# Patient Record
Sex: FEMALE | Race: WHITE | NOT HISPANIC OR LATINO | Employment: UNEMPLOYED | URBAN - METROPOLITAN AREA
[De-identification: names, ages, dates, MRNs, and addresses within clinical notes are randomized per-mention and may not be internally consistent; named-entity substitution may affect disease eponyms.]

---

## 2019-10-31 ENCOUNTER — OFFICE VISIT (OUTPATIENT)
Dept: ORTHOPEDIC SURGERY | Facility: CLINIC | Age: 74
End: 2019-10-31

## 2019-10-31 VITALS — HEIGHT: 67 IN | HEART RATE: 80 BPM | OXYGEN SATURATION: 98 % | BODY MASS INDEX: 29.24 KG/M2 | WEIGHT: 186.29 LBS

## 2019-10-31 DIAGNOSIS — M17.12 PRIMARY OSTEOARTHRITIS OF LEFT KNEE: Primary | ICD-10-CM

## 2019-10-31 DIAGNOSIS — M25.562 ACUTE PAIN OF LEFT KNEE: ICD-10-CM

## 2019-10-31 PROCEDURE — 99204 OFFICE O/P NEW MOD 45 MIN: CPT | Performed by: ORTHOPAEDIC SURGERY

## 2019-10-31 RX ORDER — SIMVASTATIN 40 MG
TABLET ORAL
COMMUNITY
Start: 2019-08-19

## 2019-10-31 RX ORDER — VENLAFAXINE 25 MG/1
25 TABLET ORAL 2 TIMES DAILY
COMMUNITY

## 2019-10-31 RX ORDER — FLUOXETINE 10 MG/1
10 CAPSULE ORAL DAILY
COMMUNITY

## 2019-10-31 RX ORDER — MELOXICAM 15 MG/1
TABLET ORAL
Qty: 60 TABLET | Refills: 0 | Status: SHIPPED | OUTPATIENT
Start: 2019-10-31 | End: 2019-11-26 | Stop reason: SDUPTHER

## 2019-10-31 RX ORDER — LEVOTHYROXINE SODIUM 88 UG/1
TABLET ORAL
COMMUNITY
Start: 2019-08-17

## 2019-10-31 NOTE — PROGRESS NOTES
Orthopaedic Clinic Note: Knee New Patient    Chief Complaint   Patient presents with   • Left Knee - Pain        HPI    Vikki Sandoval is a 74 y.o. female who presents with left knee pain for 2 week(s). Onset has been atraumatic and gradual nature.  She has known left knee arthritis and has been treated in the past with cortisone injections.  She recently moved here from [place].  She underwent a cortisone injection in September but during the move developed increasing pain and swelling as well as a buckling episode of the knee.  She is complaining of swelling, popping, dull aching and throbbing pain in the knee that is worse with walking and standing.  Sitting and resting eases her pain.  She is taking occasional Advil with only slight improvement.  She is ambulating with assistance of a cane.  She is here to discuss treatment options for ongoing knee pain which is causing limitations in daily activities.    Past Medical History:   Diagnosis Date   • Anxiety    • Diabetes (CMS/HCC)    • Hyperlipidemia    • Hypothyroid       Past Surgical History:   Procedure Laterality Date   • FOOT SURGERY Left     achilles, bone spur   • FOOT SURGERY Right     bone spur   • HYSTERECTOMY     • SHOULDER SURGERY Left     total replacement   • SHOULDER SURGERY Right     RCR      Family History   Problem Relation Age of Onset   • Cancer Mother      Social History     Socioeconomic History   • Marital status:      Spouse name: Not on file   • Number of children: Not on file   • Years of education: Not on file   • Highest education level: Not on file   Tobacco Use   • Smoking status: Former Smoker   • Smokeless tobacco: Never Used   Substance and Sexual Activity   • Alcohol use: Yes     Frequency: Never     Comment: occ   • Drug use: No   • Sexual activity: Defer      Current Outpatient Medications on File Prior to Visit   Medication Sig Dispense Refill   • FLUoxetine (PROzac) 10 MG capsule Take 10 mg by mouth Daily.     •  "levothyroxine (SYNTHROID, LEVOTHROID) 88 MCG tablet      • metFORMIN (GLUCOPHAGE) 1000 MG tablet      • simvastatin (ZOCOR) 40 MG tablet      • venlafaxine (EFFEXOR) 25 MG tablet Take 25 mg by mouth 2 (Two) Times a Day.       No current facility-administered medications on file prior to visit.       No Known Allergies     Review of Systems   Constitutional: Negative.    HENT: Negative.    Eyes: Negative.    Respiratory: Negative.    Cardiovascular: Negative.    Gastrointestinal: Negative.    Endocrine: Negative.    Genitourinary: Negative.    Musculoskeletal: Positive for arthralgias.   Skin: Negative.    Allergic/Immunologic: Negative.    Neurological: Negative.    Hematological: Negative.    Psychiatric/Behavioral: Negative.         The patient's Review of Systems was personally reviewed and confirmed as accurate.    The following portions of the patient's history were reviewed and updated as appropriate: allergies, current medications, past family history, past medical history, past social history, past surgical history and problem list.    Physical Exam  Pulse 80, height 170.2 cm (67\"), weight 84.5 kg (186 lb 4.6 oz), SpO2 98 %.    Body mass index is 29.18 kg/m².    GENERAL APPEARANCE: awake, alert & oriented x 3, in no acute distress and well developed, well nourished  PSYCH: normal affect  LUNGS:  breathing nonlabored  EYES: sclera anicteric  CARDIOVASCULAR: palpable dorsalis pedis, palpable posterior tibial bilaterally. Capillary refill less than 2 seconds  EXTREMITIES: no clubbing, cyanosis  GAIT:  Antalgic            Right Lower Extremity Exam:   ----------  Hip Exam  ----------  FLEXION CONTRACTURE: None  FLEXION: 110 degrees  INTERNAL ROTATION: 20 degrees at 90 degrees of flexion   EXTERNAL ROTATION: 40 degrees at 90 degrees of flexion    PAIN WITH HIP MOTION: no  ----------  Knee Exam  ----------  ALIGNMENT: neutral, no varus or valgus deformity     RANGE OF MOTION:  Normal (0-120 degrees) with no " extensor lag or flexion contracture  LIGAMENTOUS STABILITY:   stable to varus and valgus stress at 0 and 30 degrees without any evidence of laxity     STRENGTH:  5/5 knee flexion, extension. 5/5 ankle dorsiflexion and plantarflexion.     PAIN WITH PALPATION: denies tenderness to palpation about the knee, denies medial or lateral joint line pain  KNEE EFFUSION: no  PAIN WITH KNEE ROM: no  PATELLAR CREPITUS: yes, asymptomatic  SPECIAL EXAM FINDINGS:  Negative patellar compression    REFLEXES:  PATELLAR 2+/4  ACHILLES 2+/4    CLONUS: negative  STRAIGHT LEG TEST:   negative    SENSATION TO LIGHT TOUCH:  DEEP PERONEAL/SUPERFICIAL PERONEAL/SURAL/SAPHENOUS/TIBIAL:   intact    EDEMA:  no  ERYTHEMA:  no  WOUNDS/INCISIONS: no overlying skin problems.    Left Lower Extremity Exam:   ----------  Hip Exam  ----------  FLEXION CONTRACTURE: None  FLEXION: 110 degrees  INTERNAL ROTATION: 20 degrees at 90 degrees of flexion   EXTERNAL ROTATION: 40 degrees at 90 degrees of flexion    PAIN WITH HIP MOTION: no  ----------  Knee Exam  ----------  ALIGNMENT: moderate varus, correctible to neutral    RANGE OF MOTION:  Decreased (3 - 115 degrees) with no extensor lag  LIGAMENTOUS STABILITY:   stable to varus and valgus stress at terminal extension and 30 degrees; slight retensioning of the MCL is appreciated with valgus stress at 30 degrees consistent with medial compartment degeneration     STRENGTH:  5/5 knee flexion, extension. 5/5 ankle dorsiflexion and plantarflexion.     PAIN WITH PALPATION: global  KNEE EFFUSION: yes, moderate effusion  PAIN WITH KNEE ROM: yes, global  PATELLAR CREPITUS: yes, asymptomatic  SPECIAL EXAM FINDINGS:  none    REFLEXES:  PATELLAR 2+/4  ACHILLES 2+/4    CLONUS: no  STRAIGHT LEG TEST:   negative    SENSATION TO LIGHT TOUCH:  DEEP PERONEAL/SUPERFICIAL PERONEAL/SURAL/SAPHENOUS/TIBIAL:   intact    EDEMA:  no  ERYTHEMA:  no  WOUNDS/INCISIONS:   no      ______________________________________________________________________  ______________________________________________________________________    RADIOGRAPHIC FINDINGS:   Indication: Left knee pain    Comparison: No prior xrays are available for comparison    Left knee(s) 4 views: moderate to severe varus osteoarthritis with significant medial compartment joint space narrowing and periarticular osteophytes, Moderate patellofemoral osteoarthritis      Assessment/Plan:   Diagnosis Plan   1. Primary osteoarthritis of left knee  meloxicam (MOBIC) 15 MG tablet   2. Acute pain of left knee  XR Knee 4+ View Left     Patient has left knee arthritis.  I discussed treatment options.  It is too early to proceed with repeat cortisone injection and aspiration of the knee today.  I will prescribe her oral anti-inflammatory.  She will follow-up in 6 weeks.    Joel Gaona MD  10/31/19  10:39 AM

## 2019-11-26 DIAGNOSIS — M17.12 PRIMARY OSTEOARTHRITIS OF LEFT KNEE: ICD-10-CM

## 2019-11-26 RX ORDER — MELOXICAM 15 MG/1
TABLET ORAL
Qty: 60 TABLET | Refills: 0 | Status: SHIPPED | OUTPATIENT
Start: 2019-11-26 | End: 2019-12-12 | Stop reason: SDUPTHER

## 2019-12-12 ENCOUNTER — OFFICE VISIT (OUTPATIENT)
Dept: ORTHOPEDIC SURGERY | Facility: CLINIC | Age: 74
End: 2019-12-12

## 2019-12-12 VITALS — BODY MASS INDEX: 30.42 KG/M2 | OXYGEN SATURATION: 96 % | WEIGHT: 193.8 LBS | HEART RATE: 88 BPM | HEIGHT: 67 IN

## 2019-12-12 DIAGNOSIS — M17.12 PRIMARY OSTEOARTHRITIS OF LEFT KNEE: Primary | ICD-10-CM

## 2019-12-12 PROCEDURE — 99212 OFFICE O/P EST SF 10 MIN: CPT | Performed by: ORTHOPAEDIC SURGERY

## 2019-12-12 RX ORDER — MELOXICAM 15 MG/1
TABLET ORAL
Qty: 60 TABLET | Refills: 0 | Status: SHIPPED | OUTPATIENT
Start: 2019-12-12

## 2023-04-17 NOTE — PROGRESS NOTES
Orthopaedic Clinic Note: Knee Established Patient    Chief Complaint   Patient presents with   • Follow-up     6 week follow up; Primary osteoarthritis of left knee        HPI    It has been 6  week(s) since Ms. Sandoval's last visit. She returns to clinic today for follow-up left knee osteoarthritis.  At her last visit, she was prescribed an oral anti-inflammatory.  The anti-inflammatory has done well and her pain has significantly improved.  She rates her pain a 2/10 on the pain scale.  She only has occasional discomfort with walking and rising from a seated position.  Otherwise she is able to do all daily activities without limitations.    Past Medical History:   Diagnosis Date   • Anxiety    • Diabetes (CMS/HCC)    • Hyperlipidemia    • Hypothyroid       Past Surgical History:   Procedure Laterality Date   • FOOT SURGERY Left     achilles, bone spur   • FOOT SURGERY Right     bone spur   • HYSTERECTOMY     • SHOULDER SURGERY Left     total replacement   • SHOULDER SURGERY Right     RCR      Family History   Problem Relation Age of Onset   • Cancer Mother      Social History     Socioeconomic History   • Marital status:      Spouse name: Not on file   • Number of children: Not on file   • Years of education: Not on file   • Highest education level: Not on file   Tobacco Use   • Smoking status: Former Smoker   • Smokeless tobacco: Never Used   Substance and Sexual Activity   • Alcohol use: Yes     Frequency: Never     Comment: occ   • Drug use: No   • Sexual activity: Defer      Current Outpatient Medications on File Prior to Visit   Medication Sig Dispense Refill   • FLUoxetine (PROzac) 10 MG capsule Take 10 mg by mouth Daily.     • levothyroxine (SYNTHROID, LEVOTHROID) 88 MCG tablet      • metFORMIN (GLUCOPHAGE) 1000 MG tablet      • simvastatin (ZOCOR) 40 MG tablet      • venlafaxine (EFFEXOR) 25 MG tablet Take 25 mg by mouth 2 (Two) Times a Day.     • [DISCONTINUED] meloxicam (MOBIC) 15 MG tablet TAKE 1  "TABLET BY MOUTH EVERY DAY WITH FOOD 60 tablet 0     No current facility-administered medications on file prior to visit.       No Known Allergies     Review of Systems   Constitutional: Positive for activity change.   HENT: Negative.    Eyes: Negative.    Respiratory: Negative.    Cardiovascular: Negative.    Gastrointestinal: Negative.    Endocrine: Negative.    Genitourinary: Negative.    Musculoskeletal: Positive for arthralgias.   Skin: Negative.    Allergic/Immunologic: Negative.    Neurological: Negative.    Hematological: Negative.    Psychiatric/Behavioral: Negative.         The patient's Review of Systems was personally reviewed and confirmed as accurate.    Physical Exam  Pulse 88, height 170.2 cm (67.01\"), weight 87.9 kg (193 lb 12.8 oz), SpO2 96 %.    Body mass index is 30.35 kg/m².    GENERAL APPEARANCE: awake, alert, oriented, in no acute distress and well developed, well nourished  LUNGS:  breathing nonlabored  EXTREMITIES: no clubbing, cyanosis  PERIPHERAL PULSES: palpable dorsalis pedis and posterior tibial pulses bilaterally.    GAIT:  Normal        ----------  Left Knee Exam:  ----------  ALIGNMENT: moderate varus, correctible to neutral  ----------  RANGE OF MOTION:  Decreased (3 - 115 degrees) with no extensor lag  LIGAMENTOUS STABILITY:   stable to varus and valgus stress at terminal extension and 30 degrees; retensioning of the MCL is appreciated with valgus stress at 30 degrees consistent with medial compartment degeneration  ----------  STRENGTH:  KNEE FLEXION 5/5  KNEE EXTENSION  5/5  ANKLE DORSIFLEXION  5/5  ANKLE PLANTARFLEXION  5/5  ----------  PAIN WITH PALPATION:denies tenderness to palpation about the knee  KNEE EFFUSION: no  PAIN WITH KNEE ROM: no  PATELLAR CREPITUS:  yes, asymptomatic  ----------  SENSATION TO LIGHT TOUCH:  DEEP PERONEAL/SUPERFICIAL PERONEAL/SURAL/SAPHENOUS/TIBIAL:    intact  ----------  EDEMA:  no  ERYTHEMA:    no  WOUNDS/INCISIONS:   " no  _____________________________________________________________________  _____________________________________________________________________    RADIOGRAPHIC FINDINGS:   No new imaging today    Assessment/Plan:   Diagnosis Plan   1. Primary osteoarthritis of left knee  meloxicam (MOBIC) 15 MG tablet     Patient symptoms have greatly improved with the oral anti-inflammatory.  As a result, no further intervention warranted at this time.  We will refill her meloxicam per her request.  Follow-up as needed.      Joel Gaona MD  12/12/19  10:50 AM   Scalpel Size: 15 blade